# Patient Record
Sex: FEMALE | Race: BLACK OR AFRICAN AMERICAN | Employment: UNEMPLOYED | URBAN - METROPOLITAN AREA
[De-identification: names, ages, dates, MRNs, and addresses within clinical notes are randomized per-mention and may not be internally consistent; named-entity substitution may affect disease eponyms.]

---

## 2016-10-25 LAB
HIV 1+2 AB+HIV1 P24 AG SERPL QL IA: NONREACTIVE
RUBELLA ANTIBODY IGG QUANTITATIVE: NORMAL IU/ML
T PALLIDUM IGG SER QL: NEGATIVE

## 2017-01-18 ENCOUNTER — APPOINTMENT (OUTPATIENT)
Dept: OBGYN | Facility: CLINIC | Age: 27
End: 2017-01-18
Attending: OBSTETRICS & GYNECOLOGY
Payer: COMMERCIAL

## 2017-01-18 VITALS
WEIGHT: 238.6 LBS | DIASTOLIC BLOOD PRESSURE: 63 MMHG | HEART RATE: 85 BPM | BODY MASS INDEX: 42.28 KG/M2 | HEIGHT: 63 IN | SYSTOLIC BLOOD PRESSURE: 102 MMHG

## 2017-01-18 DIAGNOSIS — E66.9 OBESITY, UNSPECIFIED OBESITY SEVERITY, UNSPECIFIED OBESITY TYPE: ICD-10-CM

## 2017-01-18 DIAGNOSIS — O09.622 YOUNG MULTIGRAVIDA IN SECOND TRIMESTER: Primary | ICD-10-CM

## 2017-01-18 PROCEDURE — 99215 OFFICE O/P EST HI 40 MIN: CPT | Mod: ZF

## 2017-01-18 RX ORDER — PYRIDOXINE HCL (VITAMIN B6) 50 MG
50 TABLET ORAL
COMMUNITY
Start: 2016-10-12

## 2017-01-18 RX ORDER — ACETAMINOPHEN 325 MG/1
325-650 TABLET ORAL
COMMUNITY
Start: 2016-08-20

## 2017-01-18 ASSESSMENT — PAIN SCALES - GENERAL: PAINLEVEL: NO PAIN (0)

## 2017-01-18 NOTE — NURSING NOTE
Chief Complaint   Patient presents with     Prenatal Care     Here to establish OB care, she is 20 3/7 weeks

## 2017-01-18 NOTE — PROGRESS NOTES
"Transfer of care    Patient name: Ana Montero  MRN: [id number]  : 1990    HPI: 27 year old  at 20w3d by a 9w3d US who is here to Transfer care, she heard good things about this clinic and so transferred her care. She is feeling well today. She denies HA, vision changes, shortness of breath or chest pain. She denies contractions, is feeling good fetal movement and denies vaginal bleeding or loss of fluid.     ROS:   Eyes: No vision changes  Ears/Nose/Throat: No rhinorrhea, throat soreness  Respiratory: No shortness of breath, dyspnea on exertion, cough, or hemoptysis  Cardiovascular: negative for, chest pain and exertional chest pain or pressure  Gastrointestinal: negative for, nausea, vomiting, constipation and diarrhea  Genitourinary: negative for and dysuria  Musculoskeletal: negative for, back pain and joint pain  Neurologic: negative for headaches  Hematologic/Lymphatic/Immunologic: negative for, anemia and bleeding disorder  Endocrine: negative for, thyroid disorder and diabetes    PMH:   denies    PSH:   Denies    Allergies: denies    Meds: tylenol, Vit D, Vit B6,  PNV    Physical Exam:  /63 mmHg  Pulse 85  Ht 1.6 m (5' 3\")  Wt 108.228 kg (238 lb 9.6 oz)  BMI 42.28 kg/m2  LMP 2016  Constitutional: healthy, alert and no distress  Head: Normocephalic. No masses, lesions, tenderness or abnormalities  Gastrointestinal: Abdomen soft, non-tender. No masses, organomegaly  : Deferred  Musculoskeletal: extremities normal- no gross deformities noted and normal muscle tone  Skin: no suspicious lesions or rashes  Psychiatric: mentation appears normal and affect normal/bright  Hematologic/Lymphatic/Immunologic: Normal cervical lymph nodes        US: anterior placenta, 19 weeks, normal anatomy    A/P: 27 year old  at 20w3d here for PHUONG.  1. Obesity, BMI 42  - Early GCT    2. Routine OB  - MFM referral for anatomy US ordered  -HIV Neg, RPR neg, Rubella immune, Varicella " immune, Hep b neg,   - hgb 10.5      RTC in 4 weeks  Patient discussed with Dr. Tomás Wright MD 1/18/2017 8:11 PM      I agree with note as above.  Assessment and plan were jointly made.  Peri England MD

## 2017-01-18 NOTE — MR AVS SNAPSHOT
After Visit Summary   1/18/2017    Ana Montero    MRN: [id number]           Patient Information     Date Of Birth          1990        Visit Information        Provider Department      1/18/2017 2:30 PM Anh Wright MD [facility]        Today's Diagnoses     Obesity, unspecified obesity severity, unspecified obesity type    -  1     Young multigravida in second trimester            Follow-ups after your visit        Additional Services     [facility] REFERRAL-PREGNANCY       >> Patient may proceed with recommendations for further testing as directed by the Maternal Fetal Medicine Specialist >>    >> If requesting Fetal Echo: MFM will determine appropriate location for exam due to indication.    >> If requesting Lung Maturity Amnio:  If results indicate fetal lung maturity, induction or C/S is recommended within 36 hours.  Please schedule accordingly.     Dear Patient:   Please be aware that coverage of these services is subject to the terms and limitations of your health insurance plan.  Call member services at your health plan with any benefit or coverage questions.      Please bring the following to your appointment:    >>  Any x-rays, CTs or MRIs which have been performed.  Contact the facility where they were done to arrange for  prior to your scheduled appointment.  Any new CT, MRI or other procedures ordered by your specialist must be performed at a [place] facility or coordinated by your clinic's referral office.  >>  List of current medications   >>  This referral request   >>  Any documents/labs given to you for this referral                  Follow-up notes from your care team     Return in about 4 weeks (around 2/15/2017) for IAM.      Who to contact     Please call your clinic at [phone] to:    Ask questions about your health    Make or cancel appointments    Discuss your medicines    Learn about your test results    Speak to  "your doctor   If you have compliments or concerns about an experience at your clinic, or if you wish to file a complaint, please contact [facility] Patient Relations at [phone] or email us at [email or web address]         Additional Information About Your Visit        Abbott LabsharHapzing Information     Purchasing Platform is an electronic gateway that provides easy, online access to your medical records. With Purchasing Platform, you can request a clinic appointment, read your test results, renew a prescription or communicate with your care team.     To sign up for Purchasing Platform visit the website at [email or web address]   You will be asked to enter the access code listed below, as well as some personal information. Please follow the directions to create your username and password.     Your access code is: [email or web address]  Expires: 2017  9:00 AM     Your access code will  in 90 days. If you need help or a new code, please contact your [facility] or call [phone] for assistance.        Care EveryWhere ID     This is your Care EveryWhere ID. This could be used by other organizations to access your [place] medical records  [id number]        Your Vitals Were     Pulse Height BMI (Body Mass Index) Last Period          85 1.6 m (5' 3\") 42.28 kg/m2 2016         Blood Pressure from Last 3 Encounters:   17 102/63   14 132/71    Weight from Last 3 Encounters:   17 108.228 kg (238 lb 9.6 oz)   14 95.255 kg (210 lb)              We Performed the Following     Glucose tolerance, gest screen, 1 hour     [facility] REFERRAL-PREGNANCY        Primary Care Provider Office Phone # Fax #    Roseanna Mackey [phone]       [facility] [address] [address]        Thank you!     Thank you for choosing [facility]  for your care. Our goal is always to provide you with excellent care. " Hearing back from our patients is one way we can continue to improve our services. Please take a few minutes to complete the written survey that you may receive in the mail after your visit with us. Thank you!             Your Updated Medication List - Protect others around you: Learn how to safely use, store and throw away your medicines at www.disposemymeds.org.          This list is accurate as of: 1/18/17  2:56 PM.  Always use your most recent med list.                   Brand Name Dispense Instructions for use    acetaminophen 325 MG tablet    TYLENOL     Take 325-650 mg by mouth       CVS PRENATAL 28-0.8 MG Tabs      Take 1 tablet by mouth       D 5000 5000 UNITS Caps   Generic drug:  cholecalciferol      Take 5,000 Units by mouth       pyridOXINE 50 MG tablet    VITAMIN B-6     Take 50 mg by mouth

## 2017-01-18 NOTE — Clinical Note
"2017       RE: Ana Montero  [address] [facility] [address]     Dear Colleague,    Thank you for referring your patient, Ana Montero, to the [facility] at [facility]. Please see a copy of my visit note below.    Transfer of care    Patient name: Ana Montero  MRN: [id number]  : 1990    HPI: 27 year old  at 20w3d by a 9w3d US who is here to Transfer care, she heard good things about this clinic and so transferred her care. She is feeling well today. She denies HA, vision changes, shortness of breath or chest pain. She denies contractions, is feeling good fetal movement and denies vaginal bleeding or loss of fluid.     ROS:   Eyes: No vision changes  Ears/Nose/Throat: No rhinorrhea, throat soreness  Respiratory: No shortness of breath, dyspnea on exertion, cough, or hemoptysis  Cardiovascular: negative for, chest pain and exertional chest pain or pressure  Gastrointestinal: negative for, nausea, vomiting, constipation and diarrhea  Genitourinary: negative for and dysuria  Musculoskeletal: negative for, back pain and joint pain  Neurologic: negative for headaches  Hematologic/Lymphatic/Immunologic: negative for, anemia and bleeding disorder  Endocrine: negative for, thyroid disorder and diabetes    PMH:   denies    PSH:   Denies    Allergies: denies    Meds: tylenol, Vit D, Vit B6,  PNV    Physical Exam:  /63 mmHg  Pulse 85  Ht 1.6 m (5' 3\")  Wt 108.228 kg (238 lb 9.6 oz)  BMI 42.28 kg/m2  LMP 2016  Constitutional: healthy, alert and no distress  Head: Normocephalic. No masses, lesions, tenderness or abnormalities  Gastrointestinal: Abdomen soft, non-tender. No masses, organomegaly  : Deferred  Musculoskeletal: extremities normal- no gross deformities noted and normal muscle tone  Skin: no suspicious lesions or rashes  Psychiatric: mentation appears normal and affect " normal/bright  Hematologic/Lymphatic/Immunologic: Normal cervical lymph nodes        US: anterior placenta, 19 weeks, normal anatomy    A/P: 27 year old  at 20w3d here for PHUONG.  1. Obesity, BMI 42  - Early GCT    2. Routine OB  - MFM referral for anatomy US ordered  -HIV Neg, RPR neg, Rubella immune, Varicella immune, Hep b neg,   - hgb 10.5      RTC in 4 weeks  Patient discussed with Dr. Tomás Wright MD 2017 8:11 PM      I agree with note as above.  Assessment and plan were jointly made.  Peri England MD

## 2017-01-19 ENCOUNTER — PRE VISIT (OUTPATIENT)
Dept: MATERNAL FETAL MEDICINE | Facility: CLINIC | Age: 27
End: 2017-01-19

## 2017-01-19 ASSESSMENT — PATIENT HEALTH QUESTIONNAIRE - PHQ9: SUM OF ALL RESPONSES TO PHQ QUESTIONS 1-9: 0

## 2017-01-20 ENCOUNTER — OFFICE VISIT (OUTPATIENT)
Dept: MATERNAL FETAL MEDICINE | Facility: CLINIC | Age: 27
End: 2017-01-20
Attending: OBSTETRICS & GYNECOLOGY
Payer: COMMERCIAL

## 2017-01-20 ENCOUNTER — HOSPITAL ENCOUNTER (OUTPATIENT)
Dept: ULTRASOUND IMAGING | Facility: CLINIC | Age: 27
Discharge: HOME | End: 2017-01-20
Attending: OBSTETRICS & GYNECOLOGY | Admitting: OBSTETRICS & GYNECOLOGY
Payer: COMMERCIAL

## 2017-01-20 DIAGNOSIS — Z36.3 ANTENATAL SCREENING FOR MALFORMATION USING ULTRASONICS: Primary | ICD-10-CM

## 2017-01-20 DIAGNOSIS — O26.90 PREGNANCY RELATED CONDITION, UNSPECIFIED TRIMESTER: ICD-10-CM

## 2017-01-20 DIAGNOSIS — O99.210 OBESITY AFFECTING PREGNANCY: ICD-10-CM

## 2017-01-20 PROCEDURE — 76811 OB US DETAILED SNGL FETUS: CPT

## 2017-01-20 NOTE — PROGRESS NOTES
Please refer to ultrasound report under 'Imaging' Studies of 'Chart Review' tabs.    Dillan Reeder M.D.

## 2017-02-17 ENCOUNTER — OFFICE VISIT (OUTPATIENT)
Dept: MATERNAL FETAL MEDICINE | Facility: CLINIC | Age: 27
End: 2017-02-17
Attending: OBSTETRICS & GYNECOLOGY
Payer: COMMERCIAL

## 2017-02-17 ENCOUNTER — HOSPITAL ENCOUNTER (OUTPATIENT)
Dept: ULTRASOUND IMAGING | Facility: CLINIC | Age: 27
Discharge: HOME | End: 2017-02-17
Attending: OBSTETRICS & GYNECOLOGY | Admitting: OBSTETRICS & GYNECOLOGY
Payer: COMMERCIAL

## 2017-02-17 DIAGNOSIS — Z36.3 ANTENATAL SCREENING FOR MALFORMATION USING ULTRASONICS: Primary | ICD-10-CM

## 2017-02-17 DIAGNOSIS — O99.210 OBESITY AFFECTING PREGNANCY: ICD-10-CM

## 2017-02-17 PROCEDURE — 76816 OB US FOLLOW-UP PER FETUS: CPT

## 2017-02-17 NOTE — PROGRESS NOTES
"Please see \"Imaging\" tab under \"Chart Review\" for details of today's US.    Radha Fernando, DO    "

## 2017-02-17 NOTE — MR AVS SNAPSHOT
"              After Visit Summary   2017    Ana Montero    MRN: [id number]           Patient Information     Date Of Birth          1990        Visit Information        Provider Department      2017 10:00 AM Radha Fernando,  [facility]        Today's Diagnoses      screening for malformation using ultrasonics    -  1       Follow-ups after your visit        Your next 10 appointments already scheduled     2017  3:45 PM CST   RETURN OB with Radha Mcghee MD   [facility] ([facility])    [facility] [address]  [address]  [facility] [address] [phone]              Who to contact     If you have questions or need follow up information about today's clinic visit or your schedule please contact [facility] directly at [phone].  Normal or non-critical lab and imaging results will be communicated to you by Backdoorhart, letter or phone within 4 business days after the clinic has received the results. If you do not hear from us within 7 days, please contact the clinic through Backdoorhart or phone. If you have a critical or abnormal lab result, we will notify you by phone as soon as possible.  Submit refill requests through ASI System Integration or call your pharmacy and they will forward the refill request to us. Please allow 3 business days for your refill to be completed.          Additional Information About Your Visit        Backdoorhart Information     ASI System Integration lets you send messages to your doctor, view your test results, renew your prescriptions, schedule appointments and more. To sign up, go to [email or web address] . Click on \"Log in\" on the left side of the screen, which will take you to the Welcome page. Then click on \"Sign up Now\" on the right side of the page.     You will be asked to enter the access code listed below, as well as some personal " information. Please follow the directions to create your username and password.     Your access code is: [email or web address]  Expires: 2017  9:00 AM     Your access code will  in 90 days. If you need help or a new code, please call your [facility] or [phone].        Care EveryWhere ID     This is your Care EveryWhere ID. This could be used by other organizations to access your [place] medical records  [id number]        Your Vitals Were     Last Period                   2016            Blood Pressure from Last 3 Encounters:   17 102/63   14 132/71    Weight from Last 3 Encounters:   17 108.2 kg (238 lb 9.6 oz)   14 95.3 kg (210 lb)              Today, you had the following     No orders found for display       Primary Care Provider Office Phone # Fax #    Roseanna Mackey [phone]       [facility] [address] [place] [address]        Thank you!     Thank you for choosing [facility]  for your care. Our goal is always to provide you with excellent care. Hearing back from our patients is one way we can continue to improve our services. Please take a few minutes to complete the written survey that you may receive in the mail after your visit with us. Thank you!             Your Updated Medication List - Protect others around you: Learn how to safely use, store and throw away your medicines at www.disposemymeds.org.          This list is accurate as of: 17 11:37 AM.  Always use your most recent med list.                   Brand Name Dispense Instructions for use    acetaminophen 325 MG tablet    TYLENOL     Take 325-650 mg by mouth       CVS PRENATAL 28-0.8 MG Tabs      Take 1 tablet by mouth       D 5000 5000 UNITS Caps   Generic drug:  cholecalciferol      Take 5,000 Units by mouth       pyridOXINE 50 MG tablet    VITAMIN B-6     Take 50 mg by mouth

## 2017-02-23 ENCOUNTER — OFFICE VISIT (OUTPATIENT)
Dept: OBGYN | Facility: CLINIC | Age: 27
End: 2017-02-23
Attending: OBSTETRICS & GYNECOLOGY
Payer: COMMERCIAL

## 2017-02-23 VITALS
BODY MASS INDEX: 40.57 KG/M2 | DIASTOLIC BLOOD PRESSURE: 73 MMHG | SYSTOLIC BLOOD PRESSURE: 121 MMHG | HEART RATE: 84 BPM | WEIGHT: 229 LBS

## 2017-02-23 DIAGNOSIS — E66.9 OBESITY, UNSPECIFIED OBESITY SEVERITY, UNSPECIFIED OBESITY TYPE: ICD-10-CM

## 2017-02-23 DIAGNOSIS — Z3A.25 PREGNANCY WITH 25 COMPLETED WEEKS GESTATION: Primary | ICD-10-CM

## 2017-02-23 PROCEDURE — 99212 OFFICE O/P EST SF 10 MIN: CPT | Mod: ZF

## 2017-02-23 ASSESSMENT — PAIN SCALES - GENERAL: PAINLEVEL: NO PAIN (0)

## 2017-02-23 NOTE — LETTER
2/23/2017       RE: Ana Montero  [address]  [place] [address]     Dear Colleague,    Thank you for referring your patient, Ana Montero, to the [facility] at [facility]. Please see a copy of my visit note below.    [facility]  Return OB Visit    Subjective:  Ana Montreo is a 27 year old 25w6d female at 25w4d by 9w3d US, who presents today for return OB visit.  Her pregnancy is complicated by: obesity.    She states that she has been feeling well. She denies any regular contractions, leakage of fluid, or vaginal bleeding. She confirms active fetal movement. She denies any headaches, changes in vision, chest pain, shortness of breath, RUQ pain, or worsening edema.    Objective:  /73  Pulse 84  Wt 103.9 kg (229 lb)  LMP 09/20/2016  Breastfeeding? No  BMI 40.57 kg/m2  Gen: Well-appearing, NAD    Fundal Height:  25  FHR: 140s    Assessment/Plan:  Ana Montero is a 27 year old 25w6d female at 25w4d by 9w3d US, who presents today for return OB visit.  Her pregnancy is complicated by: obesity.      - PNC:    - Prenatal labs: Rh positive, Ab screen negative, RI, HIV Neg, RPR Neg, HepBsAg neg, GC/CT neg, Pap NILM   - Recommendations for early GCT d/t obesity, patient declines, but agrees to return next week for lab visit   - L&D considerations:    - Plans for no pain medications in labor.  Options reviewed    - Plans to breastfeed    - Plans for tubal ligation.  Federal tubal papers signed today. Discussed that she likely will not be able to undergo an immediate postpartum tubal ligation d/t her weight, but that she was a candidate for interval tubal. Patient understands.    - Obesity, BMI 42   - Counseled on appropriate weight gain in pregnancy.   - Comprehensive US performed by [facility]. All anatomy visualized.    - Fetal well being   - Appropriate FH and FHT    - RTC in one week for lab visit - GCT, CBC, Vitamin D,  RPR  - Staffed with Dr. Federico Hinson MD  Ob/Gyn, PGY-2  2/23/2017, 4:00 PM    The Patient was seen in [facility] by RADHA HINSON.  I reviewed the history & exam. Assessment and plan were jointly made.    MD Radha Dominguez MD

## 2017-02-23 NOTE — MR AVS SNAPSHOT
After Visit Summary   2/23/2017    Ana Montero    MRN: [id number]           Patient Information     Date Of Birth          1990        Visit Information        Provider Department      2/23/2017 3:45 PM Radha Mcghee MD [facility]        Today's Diagnoses     Pregnancy with 25 completed weeks gestation    -  1    Obesity, unspecified obesity severity, unspecified obesity type           Follow-ups after your visit        Follow-up notes from your care team     Return in about 1 week (around 3/2/2017).      Your next 10 appointments already scheduled     Mar 02, 2017 11:00 AM CST   LAB with OP LAB UR   [facility] [place], Laboratory Services ([facility])    [address]  [facility] [id number] [phone]           Patient must bring picture ID.  Patient should be prepared to give a urine specimen  Please do not eat 10-12 hours before your appointment if you are coming in fasting for labs on lipids, cholesterol, or glucose (sugar).  Pregnant women should follow their Care Team instructions. Water with medications is okay. Do not drink coffee or other fluids.   If you have concerns about taking  your medications, please ask at office or if scheduling via reKode Education, send a message by clicking on Secure Messaging, Message Your Care Team.              Who to contact     Please call your clinic at [phone] to:    Ask questions about your health    Make or cancel appointments    Discuss your medicines    Learn about your test results    Speak to your doctor   If you have compliments or concerns about an experience at your clinic, or if you wish to file a complaint, please contact [facility] Patient Relations at [phone] or email us at [email or web address]         Additional Information About Your Visit        reKode Education Information     reKode Education is an electronic gateway that provides easy,  online access to your medical records. With Sportube, you can request a clinic appointment, read your test results, renew a prescription or communicate with your care team.     To sign up for Sportube visit the website at [email or web address]   You will be asked to enter the access code listed below, as well as some personal information. Please follow the directions to create your username and password.     Your access code is: [email or web address]  Expires: 2017  9:00 AM     Your access code will  in 90 days. If you need help or a new code, please contact your [facility] or call [phone] for assistance.        Care EveryWhere ID     This is your Care EveryWhere ID. This could be used by other organizations to access your [place] medical records  [id number]        Your Vitals Were     Pulse Last Period Breastfeeding? BMI (Body Mass Index)          84 2016 No 40.57 kg/m2         Blood Pressure from Last 3 Encounters:   17 121/73   17 102/63   14 132/71    Weight from Last 3 Encounters:   17 103.9 kg (229 lb)   17 108.2 kg (238 lb 9.6 oz)   14 95.3 kg (210 lb)               Primary Care Provider Office Phone # Fax #    Roseanna Mackey [phone]       [facility] [address] [place] [address]        Thank you!     Thank you for choosing [facility]  for your care. Our goal is always to provide you with excellent care. Hearing back from our patients is one way we can continue to improve our services. Please take a few minutes to complete the written survey that you may receive in the mail after your visit with us. Thank you!             Your Updated Medication List - Protect others around you: Learn how to safely use, store and throw away your medicines at www.disposemymeds.org.          This list is accurate as of: 17 11:59 PM.  Always use your most recent med list.                    Brand Name Dispense Instructions for use    acetaminophen 325 MG tablet    TYLENOL     Take 325-650 mg by mouth       CVS PRENATAL 28-0.8 MG Tabs      Take 1 tablet by mouth       D 5000 5000 UNITS Caps   Generic drug:  cholecalciferol      Take 5,000 Units by mouth       pyridOXINE 50 MG tablet    VITAMIN B-6     Take 50 mg by mouth

## 2017-02-23 NOTE — PROGRESS NOTES
[facility]  Return OB Visit    Subjective:  Ana Montero is a 27 year old 25w6d female at 25w4d by 9w3d US, who presents today for return OB visit.  Her pregnancy is complicated by: obesity.    She states that she has been feeling well. She denies any regular contractions, leakage of fluid, or vaginal bleeding. She confirms active fetal movement. She denies any headaches, changes in vision, chest pain, shortness of breath, RUQ pain, or worsening edema.    Objective:  /73  Pulse 84  Wt 103.9 kg (229 lb)  LMP 09/20/2016  Breastfeeding? No  BMI 40.57 kg/m2  Gen: Well-appearing, NAD    Fundal Height:  25  FHR: 140s    Assessment/Plan:  Ana Montero is a 27 year old 25w6d female at 25w4d by 9w3d US, who presents today for return OB visit.  Her pregnancy is complicated by: obesity.      - PNC:    - Prenatal labs: Rh positive, Ab screen negative, RI, HIV Neg, RPR Neg, HepBsAg neg, GC/CT neg, Pap NILM   - Recommendations for early GCT d/t obesity, patient declines, but agrees to return next week for lab visit   - L&D considerations:    - Plans for no pain medications in labor.  Options reviewed    - Plans to breastfeed    - Plans for tubal ligation.  Federal tubal papers signed today. Discussed that she likely will not be able to undergo an immediate postpartum tubal ligation d/t her weight, but that she was a candidate for interval tubal. Patient understands.    - Obesity, BMI 42   - Counseled on appropriate weight gain in pregnancy.   - Comprehensive US performed by [facility]. All anatomy visualized.    - Fetal well being   - Appropriate FH and FHT    - RTC in one week for lab visit - GCT, CBC, Vitamin D, RPR  - Staffed with Dr. Federico Hinson MD  Ob/Gyn, PGY-2  2/23/2017, 4:00 PM    The Patient was seen in [facility] by MATT HINSON.  I reviewed the history & exam. Assessment and plan were jointly made.    Elisabeth Caballero MD

## 2017-03-08 ENCOUNTER — TELEPHONE (OUTPATIENT)
Dept: OBGYN | Facility: CLINIC | Age: 27
End: 2017-03-08

## 2017-03-08 ENCOUNTER — HOSPITAL ENCOUNTER (OUTPATIENT)
Facility: CLINIC | Age: 27
Discharge: HOME | End: 2017-03-08
Attending: OBSTETRICS & GYNECOLOGY | Admitting: OBSTETRICS & GYNECOLOGY
Payer: COMMERCIAL

## 2017-03-08 VITALS
DIASTOLIC BLOOD PRESSURE: 58 MMHG | HEIGHT: 63 IN | TEMPERATURE: 98.1 F | RESPIRATION RATE: 18 BRPM | SYSTOLIC BLOOD PRESSURE: 124 MMHG

## 2017-03-08 PROBLEM — Z36.89 ENCOUNTER FOR TRIAGE IN PREGNANT PATIENT: Status: ACTIVE | Noted: 2017-03-08

## 2017-03-08 LAB
ALBUMIN UR-MCNC: NEGATIVE MG/DL
APPEARANCE UR: CLEAR
BACTERIA #/AREA URNS HPF: ABNORMAL /HPF
BILIRUB UR QL STRIP: NEGATIVE
COLOR UR AUTO: YELLOW
GLUCOSE UR STRIP-MCNC: NEGATIVE MG/DL
HGB UR QL STRIP: NEGATIVE
KETONES UR STRIP-MCNC: NEGATIVE MG/DL
LEUKOCYTE ESTERASE UR QL STRIP: NEGATIVE
MUCOUS THREADS #/AREA URNS LPF: PRESENT /LPF
NITRATE UR QL: NEGATIVE
PH UR STRIP: 7 PH (ref 5–7)
RBC #/AREA URNS AUTO: 2 /HPF (ref 0–2)
SP GR UR STRIP: 1.01 (ref 1–1.03)
SQUAMOUS #/AREA URNS AUTO: 3 /HPF (ref 0–1)
URN SPEC COLLECT METH UR: ABNORMAL
UROBILINOGEN UR STRIP-MCNC: NORMAL MG/DL (ref 0–2)
WBC #/AREA URNS AUTO: 1 /HPF (ref 0–2)

## 2017-03-08 PROCEDURE — 81001 URINALYSIS AUTO W/SCOPE: CPT | Performed by: OBSTETRICS & GYNECOLOGY

## 2017-03-08 PROCEDURE — 99214 OFFICE O/P EST MOD 30 MIN: CPT

## 2017-03-08 NOTE — IP AVS SNAPSHOT
[facility]    [address]    [place] [address]    Phone:  [phone]                                       After Visit Summary   3/8/2017    Ana Montero    MRN: [id number]           After Visit Summary Signature Page     I have received my discharge instructions, and my questions have been answered. I have discussed any challenges I see with this plan with the nurse or doctor.    ..........................................................................................................................................  Patient/Patient Representative Signature      ..........................................................................................................................................  Patient Representative Print Name and Relationship to Patient    ..................................................               ................................................  Date                                            Time    ..........................................................................................................................................  Reviewed by Signature/Title    ...................................................              ..............................................  Date                                                            Time

## 2017-03-08 NOTE — PLAN OF CARE
Problem: Goal Outcome Summary  Goal: Goal Outcome Summary  Outcome: Improving  Data: [facility] at approximately 1000.   Reason for maternal/fetal assessment per patient is  Labor (evaluation)  . Pt reports feeling non-painful contractions that are accompanied by pelvic pressure.  Patient is a . Prenatal record reviewed.      Obstetric History       T2      TAB0   SAB0   E0   M0   L2        # Outcome Date GA Lbr Hang/2nd Weight Sex Delivery Anes PTL Lv   3 Current                     2 Term 16       F  EPI Y     1 Term 03       M  None Y         . Medical history:   Past Medical History   Diagnosis Date     NO ACTIVE PROBLEMS       Uncomplicated asthma         childhood   . Gestational Age 27w3d. VSS. Fetal movement present. Patient denies cramping, backache, UTI symptoms, GI problems, bloody show, vaginal bleeding, edema, headache, visual disturbances, epigastric or URQ pain, abdominal pain.   Action: Verbal consent for EFM. Triage assessment completed. EFM applied for fetal assessment. Uterine assessment shows no contractions on toco, however pt is obese and may not be tracing due to that. Fetal assessment: Presumed adequate fetal oxygenation documented (see flow record).   Response: Dr. Ordonez informed of pt's arrival and complaints. Plan per provider is assessement. Patient verbalized agreement with plan. Patient ambulatory, oriented to room and call light.

## 2017-03-08 NOTE — TELEPHONE ENCOUNTER
Pt presented to clinic too early for appointment but wanted to be seen for URI follow-up and wanted cervical check.    Roomer asked nurse to talk with her as the midwife could see her now for URI but will not do a cervical check.    Spoke with Ana who reports she was at [facility] a few weeks ago and at 24wks gestation they wanted to deliver her. She didn't want that so left the hospital but now she is having 'more pressure' so she wants to know if she is dilating more. She reports she was dilated to 1cm at [facility]. When asked why they wanted to deliver she stated 'the baby was stressed'. She reports positive fetal movement.    Advised [facility] for [facility] and she agreed with plan.     Called [facility] and gave report to Addison. Pt sees the MDs.

## 2017-03-08 NOTE — IP AVS SNAPSHOT
MRN:[id number]                      After Visit Summary   3/8/2017    Ana Montero    MRN: [id number]           Thank you!     Thank you for choosing [facility] for your care. Our goal is always to provide you with excellent care. Hearing back from our patients is one way we can continue to improve our services. Please take a few minutes to complete the written survey that you may receive in the mail after you visit with us. Thank you!        Patient Information     Date Of Birth          1990        About your hospital stay     You were admitted on:  March 8, 2017 You last received care in the:   [facility]    You were discharged on:  March 8, 2017       Who to Call     For medical emergencies, please call 911.  For non-urgent questions about your medical care, please call your primary care provider or clinic, [phone]          Attending Provider     Provider Cassy Garsia MD OB/Gyn       Primary Care Provider Office Phone # Fax #    Roseanna Mackey [phone]       [facility] ([facility] [address] [place] [address]         When to contact your care team       Call the OB/GYN Clinic or come to the [facility] if you develop any of the following symptoms:  - Painful contractions every 5-10 minutes for 1-2 hours  - Leaking fluid  - Vaginal bleeding  - Decrease in baby's movement                  Follow-up Appointments     Follow Up and recommended labs and tests       Follow-up with [facility] in the next 1-2 weeks.                  Further instructions from your care team       Discharge Instruction for Undelivered Patients      You were seen for: Labor Assessment  We Consulted: Dr. Torres and Dr. Johnson  You had (Test or Medicine): fetal monitoring and uterine monitoring     Diet:   You may eat meals and snacks.     Activity:  Call your doctor or nurse midwife if your baby is moving less than usual.     Call your  "provider if you notice:  Swelling in your face or increased swelling in your hands or legs.  Headaches that are not relieved by Tylenol (acetaminophen).  Changes in your vision (blurring: seeing spots or stars.)  Nausea (sick to your stomach) and vomiting (throwing up).   Weight gain of 5 pounds or more per week.  Heartburn that doesn't go away.  Signs of bladder infection: pain when you urinate (use the toilet), need to go more often and more urgently.  The bag of marina (rupture of membranes) breaks, or you notice leaking in your underwear.  Bright red blood in your underwear.  Abdominal (lower belly) or stomach pain.  Second (plus) baby: Contractions (tightening) less than 10 minutes apart and getting stronger.  *If less than 34 weeks: Contractions (tightenings) more than 6 times in one hour.  Increase or change in vaginal discharge (note the color and amount)      Follow-up:  Make an appointment to be seen on next week          Pending Results     No orders found from 3/6/2017 to 3/9/2017.            Statement of Approval     Ordered          03/08/17 1151  I have reviewed and agree with all the recommendations and orders detailed in this document.  EFFECTIVE NOW     Approved and electronically signed by:  Smiley Torres MD             Admission Information     Date & Time Provider Department Dept. Phone    3/8/2017 Cassy Johnson MD [facility] [phone]      Your Vitals Were     Blood Pressure Temperature Respirations Height Last Period       124/58 98.1  F (36.7  C) (Oral) 18 1.6 m (5' 3\") 09/20/2016       [employer] Information     [employer] lets you send messages to your doctor, view your test results, renew your prescriptions, schedule appointments and more. To sign up, go to [email or web address] . Click on \"Log in\" on the left side of the screen, which will take you to the Welcome page. Then click on \"Sign up Now\" on the right side of the page.     You will be asked to enter the access code " listed below, as well as some personal information. Please follow the directions to create your username and password.     Your access code is: [email or web address]  Expires: 2017  9:00 AM     Your access code will  in 90 days. If you need help or a new code, please call your [facility] or [phone].        Care EveryWhere ID     This is your Care EveryWhere ID. This could be used by other organizations to access your [place] medical records  [id number]           Review of your medicines      CONTINUE these medicines which have NOT CHANGED        Dose / Directions    acetaminophen 325 MG tablet   Commonly known as:  TYLENOL        Dose:  325-650 mg   Take 325-650 mg by mouth   Refills:  0       [employer] PRENATAL 28-0.8 MG Tabs        Dose:  1 tablet   Take 1 tablet by mouth   Refills:  0       D 5000 5000 UNITS Caps   Generic drug:  cholecalciferol        Dose:  5000 Units   Take 5,000 Units by mouth   Refills:  0       pyridOXINE 50 MG tablet   Commonly known as:  VITAMIN B-6        Dose:  50 mg   Take 50 mg by mouth   Refills:  0                Protect others around you: Learn how to safely use, store and throw away your medicines at www.disposemymeds.org.             Medication List: This is a list of all your medications and when to take them. Check marks below indicate your daily home schedule. Keep this list as a reference.      Medications           Morning Afternoon Evening Bedtime As Needed    acetaminophen 325 MG tablet   Commonly known as:  TYLENOL   Take 325-650 mg by mouth                                [employer] PRENATAL 28-0.8 MG Tabs   Take 1 tablet by mouth                                D 5000 5000 UNITS Caps   Take 5,000 Units by mouth   Generic drug:  cholecalciferol                                pyridOXINE 50 MG tablet   Commonly known as:  VITAMIN B-6   Take 50 mg by mouth

## 2017-03-08 NOTE — PROVIDER NOTIFICATION
03/08/17 1030   Provider Notification   Provider Name/Title Dr. Ordonez   Method of Notification In Department   Request Evaluate in Person   Notification Reason Patient Arrived;Status Update

## 2017-03-08 NOTE — PLAN OF CARE
Problem: Goal Outcome Summary  Goal: Goal Outcome Summary  Outcome: Adequate for Discharge Date Met:  17  No contractions seen on toco. Cervical exam by Dr. Torres shows no change from pt's report 3 weeks ago. FHTs AGA. Ok to discharge to home. Pt instructed on  labor s/s and to follow up in clinic in 1-2 weeks. Pt verbalized understanding of instructions and d/c'd home at 1205.

## 2017-03-08 NOTE — DISCHARGE INSTRUCTIONS
Discharge Instruction for Undelivered Patients      You were seen for: Labor Assessment  We Consulted: Dr. Torres and Dr. Johnson  You had (Test or Medicine): fetal monitoring and uterine monitoring     Diet:   You may eat meals and snacks.     Activity:  Call your doctor or nurse midwife if your baby is moving less than usual.     Call your provider if you notice:  Swelling in your face or increased swelling in your hands or legs.  Headaches that are not relieved by Tylenol (acetaminophen).  Changes in your vision (blurring: seeing spots or stars.)  Nausea (sick to your stomach) and vomiting (throwing up).   Weight gain of 5 pounds or more per week.  Heartburn that doesn't go away.  Signs of bladder infection: pain when you urinate (use the toilet), need to go more often and more urgently.  The bag of marina (rupture of membranes) breaks, or you notice leaking in your underwear.  Bright red blood in your underwear.  Abdominal (lower belly) or stomach pain.  Second (plus) baby: Contractions (tightening) less than 10 minutes apart and getting stronger.  *If less than 34 weeks: Contractions (tightenings) more than 6 times in one hour.  Increase or change in vaginal discharge (note the color and amount)      Follow-up:  Make an appointment to be seen on next week

## 2017-03-08 NOTE — PROGRESS NOTES
"[facility]  OB Triage Note    CC:  Pelvic pressure    HPI: Ms. Ana Montero is a 27 year old  at 27w3d by 9w3d US, who presents for a \"cervical check\" Patient was recently admitted to [facility] from  - 2/15/17 for viral URI with bronchospasms. During the hospitalization fetal heart rate tracing demonstrated some decelerations and she received a course of betamethasone and magnesium for neuroprotection.  Since that admission she has been doing much better with no ongoing respiratory issues.  Over the past few days she has noticed tightening of her abdomen about once every 30 minutes with some pelvic pressure.  Otherwise feeling well.  Had an episode of liquid discharge per vagina a few days ago but has been completely dry since than.   + Good fetal movement.    Objective:  Patient Vitals for the past 24 hrs:   BP Temp Temp src Resp Height   17 1022 - - - - 1.6 m (5' 3\")   17 1017 124/58 98.1  F (36.7  C) Oral 18 -     Gen: Well-appearing, NAD  Abd: Soft, gravid, obese  Cvx: Closed/50/high.  (External os open 1cm; internal closed)    FHT: , moderate yina, appropriate for gestational age, no decels  Bells: No contractions    Labs:  Results for orders placed or performed during the hospital encounter of 17 (from the past 24 hour(s))   UA with Microscopic reflex to Culture   Result Value Ref Range    Color Urine Yellow     Appearance Urine Clear     Glucose Urine Negative NEG mg/dL    Bilirubin Urine Negative NEG    Ketones Urine Negative NEG mg/dL    Specific Gravity Urine 1.015 1.003 - 1.035    Blood Urine Negative NEG    pH Urine 7.0 5.0 - 7.0 pH    Protein Albumin Urine Negative NEG mg/dL    Urobilinogen mg/dL Normal 0.0 - 2.0 mg/dL    Nitrite Urine Negative NEG    Leukocyte Esterase Urine Negative NEG    Source Unspecified Urine     WBC Urine 1 0 - 2 /HPF    RBC Urine 2 0 - 2 /HPF    Bacteria Urine Few (A) NEG /HPF    Squamous Epithelial /HPF " Urine 3 (H) 0 - 1 /HPF    Mucous Urine Present (A) NEG /LPF        Assesment/Plan:  Ms. Ana Montero is a 27 year old  at 27w3d by 9w3d US admitted to triage with complaints of pelvic pressure with intermittent contractions.  No contractions while in triage; cervix closed.  UA negative.  Category I, appropriate for gestational age.    -no evidence for  labor.  -discharge to home with follow-up in clinic in 1-2 weeks    Staffed with Dr. Johnson.     Smiley oTrres MD  OB/GYN Resident, PGY3  17 11:55 AM      [facility] staff:  Appreciate note by Dr. Torres. I have reviewed, edited, and agree with the note.        Cassy Johnson MD, FACOG  3/8/2017  4:45 PM